# Patient Record
Sex: FEMALE | Race: BLACK OR AFRICAN AMERICAN
[De-identification: names, ages, dates, MRNs, and addresses within clinical notes are randomized per-mention and may not be internally consistent; named-entity substitution may affect disease eponyms.]

---

## 2019-12-02 ENCOUNTER — HOSPITAL ENCOUNTER (EMERGENCY)
Dept: HOSPITAL 72 - EMR | Age: 24
Discharge: HOME | End: 2019-12-02
Payer: MEDICAID

## 2019-12-02 VITALS — DIASTOLIC BLOOD PRESSURE: 71 MMHG | SYSTOLIC BLOOD PRESSURE: 113 MMHG

## 2019-12-02 VITALS — SYSTOLIC BLOOD PRESSURE: 113 MMHG | DIASTOLIC BLOOD PRESSURE: 71 MMHG

## 2019-12-02 VITALS — BODY MASS INDEX: 18.44 KG/M2 | HEIGHT: 64 IN | WEIGHT: 108 LBS

## 2019-12-02 DIAGNOSIS — J02.0: Primary | ICD-10-CM

## 2019-12-02 DIAGNOSIS — J45.909: ICD-10-CM

## 2019-12-02 PROCEDURE — 99282 EMERGENCY DEPT VISIT SF MDM: CPT

## 2019-12-02 NOTE — EMERGENCY ROOM REPORT
History of Present Illness


General


Chief Complaint:  Upper Respiratory Illness


Source:  Patient





Present Illness


HPI


24-year-old female with no significant past medical history here complaining of 

3 days of a 10 out of 10 sore throat and congestion as well as low-grade fever.

  Complains of history of asthma and reports that she ran out of her albuterol 

inhaler.  Denies chest pain shortness of breath at this time.  Complains of 

phlegm production.  Reports that she does not want to take cough syrup.  Denies 

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  Patient also reports 

that she got her.  Week ago however it was more spotting than usual and seen 

later and wants to be tested for pregnancy.  When I told her that we can tested 

for urine pregnancy however will take about 20 to 30 minutes patient decided to 

go home and do that at home herself.


Allergies:  


Coded Allergies:  


     No Known Allergies (Unverified , 12/2/19)





Patient History


Past Medical History:  see triage record


Past Surgical History:  unable to obtain


Pertinent Family History:  none


Last Menstrual Period:  11/22/19


Pregnant Now:  No


Immunizations:  UTD


Reviewed Nursing Documentation:  PMH: Agreed; PSxH: Agreed





Nursing Documentation-PMH


Past Medical History:  No History, Except For


Hx Asthma:  Yes





Review of Systems


All Other Systems:  negative except mentioned in HPI





Physical Exam





Vital Signs








  Date Time  Temp Pulse Resp B/P (MAP) Pulse Ox O2 Delivery O2 Flow Rate FiO2


 


12/2/19 19:20 100.2 110 18 113/71 (85) 97 Room Air  








Sp02 EP Interpretation:  reviewed, normal


General Appearance:  no apparent distress, alert, GCS 15, non-toxic


Head:  normocephalic, atraumatic


Eyes:  bilateral eye normal inspection, bilateral eye PERRL


ENT:  EOM grossly intact, no angioedema, normal voice, TMs + canals normal, 

pharyngeal erythema, tonsillar exudate


Neck:  full range of motion, supple, thyroid normal, no meningismus, no bony 

tend, supple/symm/no masses


Respiratory:  chest non-tender, lungs clear, normal breath sounds, no rhonchi, 

no respiratory distress, no retraction, no accessory muscle use, no wheezing, 

speaking full sentences


Cardiovascular #1:  regular rate, rhythm, no edema, no murmur


Gastrointestinal:  normal bowel sounds, non tender, soft, non-distended, no 

guarding, no rebound


Genitourinary:  no CVA tenderness


Musculoskeletal:  back normal, normal range of motion


Neurologic:  alert, motor strength/tone normal, oriented x3, sensory intact, 

responsive, speech normal


Psychiatric:  judgement/insight normal, memory normal, mood/affect normal, no 

suicidal/homicidal ideation


Skin:  no rash


Lymphatic:  no adenopathy





Medical Decision Making


PA Attestation


All my diagnosis and treatment plans were reviewed ad discussed with my 

supervising physician Dr. Guillen


Diagnostic Impression:  


 Primary Impression:  


 Strep pharyngitis


 Additional Impression:  


 Asthma


ER Course


24-year-old female with no significant past medical history here complaining of 

3 days of a 10 out of 10 sore throat and congestion as well as low-grade fever.

  Complains of history of asthma and reports that she ran out of her albuterol 

inhaler.  Denies chest pain shortness of breath at this time.  Complains of 

phlegm production.  Reports that she does not want to take cough syrup.  Denies 

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  Patient also reports 

that she got her.  Week ago however it was more spotting than usual and seen 

later and wants to be tested for pregnancy.  When I told her that we can tested 

for urine pregnancy however will take about 20 to 30 minutes patient decided to 

go home and do that at home herself.





Ddx considered but are not limited to: strep pharyngitis, URI, tonsillitis, 

peritonsillar abscess, influneza, 














Vital signs: are WNL, pt. is afebrile








 H&PE are most consistent with: Strep pharyngitis, asthma














ORDERS: Azithromycin, Tessalon Perles, prednisone, albuterol inhaler











ED INTERVENTIONS: None required at this time.  No chest x-ray is needed at this 

time as patient lungs are clear to auscultation























DISCHARGE: At this time pt. is stable for d/c to home. Will provide printed 

patient care instructions, and any necessary prescriptions. Care plan and 

follow up instructions have been discussed with the patient prior to discharge.

  Patient to follow with her primary care provider, low-grade fever secondary 

to infection, slightly elevated heart rate secondary to not using her albuterol 

inhaler when needed.  Patient sitting comfortably with stable vital signs upon 

discharge.





Last Vital Signs








  Date Time  Temp Pulse Resp B/P (MAP) Pulse Ox O2 Delivery O2 Flow Rate FiO2


 


12/2/19 19:20 100.2 110 18 113/71 (85) 97 Room Air  








Disposition:  HOME, SELF-CARE


Condition:  Stable


Scripts


Prednisone* (PREDNISONE*) 20 Mg Tablet


40 MG ORAL DAILY for 5 Days, #10 TAB


   Prov: Mike Nino         12/2/19 


Albuterol Sulfate (VENTOLIN HFA) 18 Gm Hfa.[id number].[email or web address]


2 PUFFS INH EVERY 6 HOURS, #18 GM 0 Refills


   Prov: Mike Nino         12/2/19 


Benzonatate* (TESSALON PERLE*) 100 Mg Capsule


100 MG ORAL THREE TIMES A DAY, #15 PERLE


   Prov: Mike Nino         12/2/19 


Azithromycin* (ZITHROMAX*) 250 Mg Tablet


250 MG ORAL DAILY, #6 TAB 0 Refills


   Take two tables once daily for 1 day, then one tablet once daily for 4


   days.


   Prov: Mike Nino         12/2/19


Patient Instructions:  Asthma, Adult, Easy-to-Read, Pharyngitis, Easy-to-Read





Additional Instructions:  


Take medication as directed, follow-up with your primary care provider, if 

worsening symptoms return to emergency room.  Low-grade fever and elevated 

heart rate is secondary to your infection and not using her albuterol inhaler 

as urine is medically you should always have your albuterol inhaler with you.











Mike Nino Dec 2, 2019 19:40

## 2019-12-02 NOTE — NUR
ED Nurse Note:



pt is cleared to be d/c per ER provider, pt discharge and aftercare instruction 
w/ prescription provided, pt education done via discussion and handout, pt 
advised to follow up with pcp or return to ED if changes in condition, pt 
verbalized understanding and agrees with plan, vss, ambulatory w/ steady gait, 
left w/ all belongings.

## 2020-10-07 ENCOUNTER — HOSPITAL ENCOUNTER (EMERGENCY)
Dept: HOSPITAL 72 - EMR | Age: 25
Discharge: HOME | End: 2020-10-07
Payer: MEDICAID

## 2020-10-07 VITALS — HEIGHT: 62 IN | WEIGHT: 106 LBS | BODY MASS INDEX: 19.51 KG/M2

## 2020-10-07 VITALS — SYSTOLIC BLOOD PRESSURE: 115 MMHG | DIASTOLIC BLOOD PRESSURE: 86 MMHG

## 2020-10-07 VITALS — DIASTOLIC BLOOD PRESSURE: 80 MMHG | SYSTOLIC BLOOD PRESSURE: 121 MMHG
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
## 2020-10-07 DIAGNOSIS — S80.862A: Primary | ICD-10-CM
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
## 2020-10-07 DIAGNOSIS — J45.909: ICD-10-CM
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
## 2020-10-07 DIAGNOSIS — S80.861A: ICD-10-CM

## 2020-10-07 PROCEDURE — 99282 EMERGENCY DEPT VISIT SF MDM: CPT

## 2020-10-07 NOTE — EMERGENCY ROOM REPORT
History of Present Illness


General


Chief Complaint:  Skin Rash/Abscess


Source:  Patient





Present Illness


HPI


25-year-old female with no no signal past medical history here complaining of 

pruritic rash bilateral lower extremities.  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 

in her bed very small and reports that they might be having fleas inside the 

house.  Denies any pain at the site of rashes.  Denies any fever chills, cough 

or congestion, shortness of breath.  Has not taken medication for symptom 

relief.  No pus drainage noted from the lesions.  They appear to be diffuse and 

healing.  Denies pregnancy.


Allergies:  


Coded Allergies:  


     No Known Allergies (Unverified , 12/2/19)





COVID-19 Screening


Contact w/high risk pt:  No


Experienced COVID-19 symptoms?:  No


COVID-19 Testing performed PTA:  No





Patient History


Past Medical History:  see triage record


Past Surgical History:  none


Pertinent Family History:  none


Pregnant Now:  No


Immunizations:  UTD


Reviewed Nursing Documentation:  PMH: Agreed; PSxH: Agreed





Nursing Documentation-PMH


Past Medical History:  No Stated History


Hx Asthma:  Yes





Review of Systems


All Other Systems:  negative except mentioned in HPI





Physical Exam





Vital Signs








  Date Time  Temp Pulse Resp B/P (MAP) Pulse Ox O2 Delivery O2 Flow Rate FiO2


 


10/7/20 19:13 98.4 90 17 120/81 (94) 98 Room Air  








Sp02 EP Interpretation:  reviewed, normal


General Appearance:  no apparent distress, alert, GCS 15, non-toxic


Head:  normocephalic, atraumatic


ENT:  hearing grossly normal, normal pharynx, no angioedema, normal voice


Neck:  full range of motion, supple, supple/symm/no masses


Respiratory:  chest non-tender, lungs clear, normal breath sounds, no rhonchi, 

no respiratory distress, no retraction, no wheezing, speaking full sentences


Cardiovascular #1:  regular rate, rhythm, no edema, no murmur


Cardiovascular #2:  2+ dorsalis pedis (R), 2+ dorsalis pedis (L)


Gastrointestinal:  normal bowel sounds, non tender, soft, non-distended, no 

guarding, no rebound


Musculoskeletal:  back normal, no calf tenderness, no lower extremity edema, 

non-tender


Neurologic:  alert, motor strength/tone normal, oriented x3, sensory intact, 

responsive, speech normal


Psychiatric:  judgement/insight normal, memory normal, mood/affect normal, no 

suicidal/homicidal ideation


Skin:  rash - Flat macular rashes appear to be diffuse bilateral lower 

extremities.  No pus drainage noted


Lymphatic:  no adenopathy





Medical Decision Making


PA Attestation


 


All diagnoses and treatment plans were reviewed and discussed with my 

supervising physician Dr. Clayton


Diagnostic Impression:  


   Primary Impression:  


   Flea bite


ER Course


25-year-old female with no no signal past medical history here complaining of 

pruritic rash bilateral lower extremities.  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 

in her bed very small and reports that they might be having fleas inside the 

house.  Denies any pain at the site of rashes.  Denies any fever chills, cough 

or congestion, shortness of breath.  Has not taken medication for symptom 

relief.  No pus drainage noted from the lesions.  They appear to be diffuse and 

healing.  Denies pregnancy.





Ddx considered but are not limited to: Eczema, scabies, lice, flea bite





Vital signs: are WNL, pt. is afebrile





H&PE are most consistent with: Flea bite





ORDERS: Hydrocortisone cream, Benadryl


ED INTERVENTIONS: None required at this time.








DISCHARGE: At this time pt. is stable for d/c to home. Will provide printed 

patient care instructions, and any necessary prescriptions. Care plan and follow

up instructions have been discussed with the patient prior to discharge.


Patient take medication as directed, follow-up primary care provider, worsening 

symptoms return to the emergency room





Last Vital Signs








  Date Time  Temp Pulse Resp B/P (MAP) Pulse Ox O2 Delivery O2 Flow Rate FiO2


 


10/7/20 19:17 98.4 80 19 115/86 98 Room Air  








Disposition:  HOME, SELF-CARE


Condition:  Stable


Scripts


Diphenhydramine HCl (Benadryl) 25 Mg Capsule


25 MG PO BID, #20 CAP


   Prov: Mike Nino         10/7/20 


Hydrocortisone/Aloe (Hydrocortisone/Aloe 1% Cream*) Y Cr


1 APPLIC TOPIC Q6H PRN for Itching, #30 GM


   Prov: Mike Nino         10/7/20


Patient Instructions:  Rash





Additional Instructions:  


Take medication as directed, follow-up with your primary care provider, wash all

sheets and clothing, change of mattress recommended, if worsening symptoms 

return to the emergency room











Mike Nino       Oct 7, 2020 19:42

## 2020-10-07 NOTE — NUR
ED Nurse Note:



PT walked in to ed for C/O possible bug bite to BUE and BLE x 1 week. pt 
reports seeint fleas at her home

## 2020-11-04 ENCOUNTER — HOSPITAL ENCOUNTER (EMERGENCY)
Dept: HOSPITAL 87 - ER | Age: 25
Discharge: HOME | End: 2020-11-04
Payer: COMMERCIAL

## 2020-11-04 VITALS — DIASTOLIC BLOOD PRESSURE: 68 MMHG | SYSTOLIC BLOOD PRESSURE: 116 MMHG

## 2020-11-04 VITALS — HEIGHT: 64 IN | WEIGHT: 114.64 LBS | BODY MASS INDEX: 19.57 KG/M2

## 2020-11-04 DIAGNOSIS — M54.2: Primary | ICD-10-CM

## 2020-11-04 PROCEDURE — 81025 URINE PREGNANCY TEST: CPT

## 2020-11-04 PROCEDURE — 99282 EMERGENCY DEPT VISIT SF MDM: CPT

## 2021-05-06 ENCOUNTER — HOSPITAL ENCOUNTER (EMERGENCY)
Dept: HOSPITAL 87 - ER | Age: 26
Discharge: HOME | End: 2021-05-06
Payer: COMMERCIAL

## 2021-05-06 VITALS — HEIGHT: 72 IN | WEIGHT: 116.84 LBS | BODY MASS INDEX: 15.83 KG/M2

## 2021-05-06 VITALS — SYSTOLIC BLOOD PRESSURE: 107 MMHG | DIASTOLIC BLOOD PRESSURE: 63 MMHG

## 2021-05-06 DIAGNOSIS — Z3A.01: ICD-10-CM

## 2021-05-06 DIAGNOSIS — O20.0: Primary | ICD-10-CM

## 2021-05-06 LAB
APPEARANCE UR: CLEAR
B-HCG SERPL-ACNC: (no result) MIU/ML (ref ?–3)
BASOPHILS NFR BLD AUTO: 1.2 % (ref 0–2)
CHLORIDE SERPL-SCNC: 107 MEQ/L (ref 98–107)
COLOR UR: YELLOW
EOSINOPHIL NFR BLD AUTO: 1.1 % (ref 0–5)
ERYTHROCYTE [DISTWIDTH] IN BLOOD BY AUTOMATED COUNT: 13.3 % (ref 11.6–14.6)
HCT VFR BLD AUTO: 42 % (ref 36–48)
HGB BLD-MCNC: 14.7 G/DL (ref 12–16)
HGB UR QL STRIP: NEGATIVE
KETONES UR STRIP-MCNC: (no result) MG/DL
LEUKOCYTE ESTERASE UR QL STRIP: NEGATIVE
LYMPHOCYTES NFR BLD AUTO: 34.6 % (ref 20–50)
MCH RBC QN AUTO: 29.2 PG (ref 28–32)
MCV RBC AUTO: 83.5 FL (ref 81–99)
MONOCYTES NFR BLD AUTO: 10.3 % (ref 2–8)
NEUTROPHILS NFR BLD AUTO: 52.8 % (ref 40–76)
NITRITE UR QL STRIP: NEGATIVE
PH UR STRIP: 7 [PH] (ref 4.5–8)
PLATELET # BLD AUTO: 218 X1000/UL (ref 130–400)
PMV BLD AUTO: 8 FL (ref 7.4–10.4)
PROT UR QL STRIP: NEGATIVE
RBC # BLD AUTO: 5.03 MILL/UL (ref 4.2–5.4)
SP GR UR STRIP: 1.01 (ref 1–1.03)
UROBILINOGEN UR STRIP-MCNC: 1 E.U./DL (ref 0.2–1)

## 2021-05-06 PROCEDURE — 81003 URINALYSIS AUTO W/O SCOPE: CPT

## 2021-05-06 PROCEDURE — 81025 URINE PREGNANCY TEST: CPT

## 2021-05-06 PROCEDURE — 86900 BLOOD TYPING SEROLOGIC ABO: CPT

## 2021-05-06 PROCEDURE — 36415 COLL VENOUS BLD VENIPUNCTURE: CPT

## 2021-05-06 PROCEDURE — 84702 CHORIONIC GONADOTROPIN TEST: CPT

## 2021-05-06 PROCEDURE — 76801 OB US < 14 WKS SINGLE FETUS: CPT

## 2021-05-06 PROCEDURE — 80053 COMPREHEN METABOLIC PANEL: CPT

## 2021-05-06 PROCEDURE — 86850 RBC ANTIBODY SCREEN: CPT

## 2021-05-06 PROCEDURE — 85025 COMPLETE CBC W/AUTO DIFF WBC: CPT

## 2021-05-06 PROCEDURE — 99284 EMERGENCY DEPT VISIT MOD MDM: CPT

## 2023-05-25 ENCOUNTER — HOSPITAL ENCOUNTER (EMERGENCY)
Dept: HOSPITAL 87 - ER | Age: 28
Discharge: HOME | End: 2023-05-25
Payer: COMMERCIAL

## 2023-05-25 VITALS — BODY MASS INDEX: 19.88 KG/M2 | HEIGHT: 62 IN | WEIGHT: 108.03 LBS

## 2023-05-25 VITALS — SYSTOLIC BLOOD PRESSURE: 114 MMHG | DIASTOLIC BLOOD PRESSURE: 62 MMHG

## 2023-05-25 DIAGNOSIS — O20.0: Primary | ICD-10-CM

## 2023-05-25 DIAGNOSIS — Z3A.01: ICD-10-CM

## 2023-05-25 LAB
B-HCG SERPL-ACNC: 1054 MIU/ML (ref ?–3)
BASOPHILS NFR BLD AUTO: 0.8 % (ref 0–2)
CHLORIDE SERPL-SCNC: 106 MEQ/L (ref 98–107)
EOSINOPHIL NFR BLD AUTO: 2.7 % (ref 0–5)
ERYTHROCYTE [DISTWIDTH] IN BLOOD BY AUTOMATED COUNT: 17.3 % (ref 11.6–14.6)
HCT VFR BLD AUTO: 36.3 % (ref 36–48)
HGB BLD-MCNC: 11.8 G/DL (ref 12–16)
LYMPHOCYTES NFR BLD AUTO: 45.5 % (ref 20–50)
MCH RBC QN AUTO: 25.1 PG (ref 28–32)
MCV RBC AUTO: 77.2 FL (ref 81–99)
MONOCYTES NFR BLD AUTO: 11.5 % (ref 2–8)
NEUTROPHILS NFR BLD AUTO: 39.5 % (ref 40–76)
PLATELET # BLD AUTO: 278 X1000/UL (ref 130–400)
PMV BLD AUTO: 8.2 FL (ref 7.4–10.4)
RBC # BLD AUTO: 4.7 MILL/UL (ref 4.2–5.4)

## 2023-05-25 PROCEDURE — 86850 RBC ANTIBODY SCREEN: CPT

## 2023-05-25 PROCEDURE — 36415 COLL VENOUS BLD VENIPUNCTURE: CPT

## 2023-05-25 PROCEDURE — 86900 BLOOD TYPING SEROLOGIC ABO: CPT

## 2023-05-25 PROCEDURE — 81025 URINE PREGNANCY TEST: CPT

## 2023-05-25 PROCEDURE — 76801 OB US < 14 WKS SINGLE FETUS: CPT

## 2023-05-25 PROCEDURE — 99284 EMERGENCY DEPT VISIT MOD MDM: CPT

## 2023-05-25 PROCEDURE — 80053 COMPREHEN METABOLIC PANEL: CPT

## 2023-05-25 PROCEDURE — 84702 CHORIONIC GONADOTROPIN TEST: CPT

## 2023-05-25 PROCEDURE — 85025 COMPLETE CBC W/AUTO DIFF WBC: CPT
